# Patient Record
Sex: FEMALE | NOT HISPANIC OR LATINO
[De-identification: names, ages, dates, MRNs, and addresses within clinical notes are randomized per-mention and may not be internally consistent; named-entity substitution may affect disease eponyms.]

---

## 2023-01-31 PROBLEM — Z00.00 ENCOUNTER FOR PREVENTIVE HEALTH EXAMINATION: Status: ACTIVE | Noted: 2023-01-31

## 2023-02-01 ENCOUNTER — APPOINTMENT (OUTPATIENT)
Dept: ORTHOPEDIC SURGERY | Facility: CLINIC | Age: 68
End: 2023-02-01

## 2023-02-14 ENCOUNTER — APPOINTMENT (OUTPATIENT)
Dept: ORTHOPEDIC SURGERY | Facility: CLINIC | Age: 68
End: 2023-02-14
Payer: MEDICARE

## 2023-02-14 VITALS — BODY MASS INDEX: 31.28 KG/M2 | HEIGHT: 62 IN | WEIGHT: 170 LBS

## 2023-02-14 DIAGNOSIS — M77.12 LATERAL EPICONDYLITIS, RIGHT ELBOW: ICD-10-CM

## 2023-02-14 DIAGNOSIS — M77.11 LATERAL EPICONDYLITIS, RIGHT ELBOW: ICD-10-CM

## 2023-02-14 DIAGNOSIS — M25.521 PAIN IN RIGHT ELBOW: ICD-10-CM

## 2023-02-14 PROCEDURE — 73070 X-RAY EXAM OF ELBOW: CPT | Mod: RT

## 2023-02-14 PROCEDURE — 99203 OFFICE O/P NEW LOW 30 MIN: CPT

## 2023-02-14 NOTE — HISTORY OF PRESENT ILLNESS
[de-identified] : Patient presents for evaluation on RT elbow pain. States she had fall back in 2012 on her elbow which she had surgery at [facility] with Dr. Cunningham. Patient states that she has pain in both elbows but RT is worse than LT. Describes the pain as dull and aching. States that she has been icing and doing warm compresses to help. Patient is taking aleve as needed. Patient is RHD. The pain started about 2 weeks ago after she tried doing push ups as part of exercise routine.

## 2023-02-14 NOTE — PHYSICAL EXAM
[NL (150)] : flexion 150 degrees [NL (0)] : extension 0 degrees [NL (90)] : supination 90 degrees [4___] : extension 4[unfilled]/5 [] : light touch intact [Right] : right elbow [There are no fractures, subluxations or dislocations. No significant abnormalities are seen] : There are no fractures, subluxations or dislocations. No significant abnormalities are seen [FreeTextEntry3] : healed incision, prominent hardware [FreeTextEntry1] : olecranon plate and screws in place without signs of hardware failure

## 2023-02-14 NOTE — DISCUSSION/SUMMARY
[de-identified] : I reviewed patient's radiographs and discussed her condition and treatment options.  I advised course of PT.  Patient voiced understanding and agreement with the plan.\par

## 2023-03-20 ENCOUNTER — TRANSCRIPTION ENCOUNTER (OUTPATIENT)
Age: 68
End: 2023-03-20

## 2023-03-27 ENCOUNTER — FORM ENCOUNTER (OUTPATIENT)
Age: 68
End: 2023-03-27

## 2023-04-14 ENCOUNTER — APPOINTMENT (OUTPATIENT)
Dept: CARDIOLOGY | Facility: CLINIC | Age: 68
End: 2023-04-14
Payer: MEDICARE

## 2023-04-14 ENCOUNTER — NON-APPOINTMENT (OUTPATIENT)
Age: 68
End: 2023-04-14

## 2023-04-14 VITALS
OXYGEN SATURATION: 99 % | BODY MASS INDEX: 30.36 KG/M2 | HEIGHT: 62 IN | WEIGHT: 165 LBS | SYSTOLIC BLOOD PRESSURE: 102 MMHG | HEART RATE: 70 BPM | DIASTOLIC BLOOD PRESSURE: 70 MMHG

## 2023-04-14 DIAGNOSIS — Z86.59 PERSONAL HISTORY OF OTHER MENTAL AND BEHAVIORAL DISORDERS: ICD-10-CM

## 2023-04-14 DIAGNOSIS — Z78.9 OTHER SPECIFIED HEALTH STATUS: ICD-10-CM

## 2023-04-14 DIAGNOSIS — Z83.3 FAMILY HISTORY OF DIABETES MELLITUS: ICD-10-CM

## 2023-04-14 PROCEDURE — 99204 OFFICE O/P NEW MOD 45 MIN: CPT

## 2023-04-14 NOTE — ASSESSMENT
[FreeTextEntry1] : Palpitations: 1 week event monitoring will be arranged when she is next tapering her benzodiazepine.\par \par Shortness of breath: Transthoracic echocardiography is indicated to assess for myocardial disease.

## 2023-04-14 NOTE — REASON FOR VISIT
[FreeTextEntry1] : The patient is complaining of palpitations.  The patient is attempting to withdrawal off of benzodiazepine drugs.  Every time she reduces the dose she has palpitations at night.  They last for about a minute at a time.  She awakes with the palpitations.  There were no other associated symptoms.  They resolved with deep breaths and water.  There has been no syncope.  There has been no near syncope.  The patient has fairly good exercise capacity.  There is minimal dyspnea with heavy exertion which she feels is normal for her.

## 2023-04-19 ENCOUNTER — APPOINTMENT (OUTPATIENT)
Dept: CARDIOLOGY | Facility: CLINIC | Age: 68
End: 2023-04-19
Payer: MEDICARE

## 2023-04-19 PROCEDURE — ZZZZZ: CPT

## 2023-04-21 ENCOUNTER — APPOINTMENT (OUTPATIENT)
Dept: CARDIOLOGY | Facility: CLINIC | Age: 68
End: 2023-04-21
Payer: MEDICARE

## 2023-04-21 PROCEDURE — ZZZZZ: CPT

## 2023-04-26 ENCOUNTER — APPOINTMENT (OUTPATIENT)
Dept: CARDIOLOGY | Facility: CLINIC | Age: 68
End: 2023-04-26
Payer: MEDICARE

## 2023-04-26 VITALS
HEIGHT: 62 IN | BODY MASS INDEX: 30.36 KG/M2 | HEART RATE: 71 BPM | DIASTOLIC BLOOD PRESSURE: 80 MMHG | OXYGEN SATURATION: 100 % | SYSTOLIC BLOOD PRESSURE: 132 MMHG | WEIGHT: 165 LBS

## 2023-04-26 PROCEDURE — 99213 OFFICE O/P EST LOW 20 MIN: CPT

## 2023-04-26 NOTE — REASON FOR VISIT
[FreeTextEntry1] : Palps:  event monitor pending.  Still w palps, no syncope, no near syncope.\par \par SOB:  echo pending, min brito.

## 2023-04-26 NOTE — ASSESSMENT
[FreeTextEntry1] : Palps:  event monitor pending.  Still w palps, no syncope, no near syncope.\par \par SOB:  echo pending, min brito.\par \par Continues to have anxiety.

## 2023-04-28 ENCOUNTER — NON-APPOINTMENT (OUTPATIENT)
Age: 68
End: 2023-04-28

## 2023-05-01 ENCOUNTER — APPOINTMENT (OUTPATIENT)
Dept: CARDIOLOGY | Facility: CLINIC | Age: 68
End: 2023-05-01

## 2023-05-05 ENCOUNTER — APPOINTMENT (OUTPATIENT)
Dept: CARDIOLOGY | Facility: CLINIC | Age: 68
End: 2023-05-05
Payer: SELF-PAY

## 2023-05-05 PROCEDURE — 93244 EXT ECG>48HR<7D REV&INTERPJ: CPT

## 2023-05-11 ENCOUNTER — APPOINTMENT (OUTPATIENT)
Dept: CARDIOLOGY | Facility: CLINIC | Age: 68
End: 2023-05-11
Payer: MEDICARE

## 2023-05-11 VITALS
SYSTOLIC BLOOD PRESSURE: 110 MMHG | OXYGEN SATURATION: 99 % | WEIGHT: 160 LBS | HEART RATE: 80 BPM | DIASTOLIC BLOOD PRESSURE: 80 MMHG | BODY MASS INDEX: 29.26 KG/M2

## 2023-05-11 PROCEDURE — 99213 OFFICE O/P EST LOW 20 MIN: CPT

## 2023-05-11 PROCEDURE — 93306 TTE W/DOPPLER COMPLETE: CPT

## 2023-05-11 NOTE — REASON FOR VISIT
[FreeTextEntry1] : Palpitations: Subjectively slightly better.  The patient continues to have on-and-off palpitations.  There has been no syncope.  There is been no near syncope.  Event monitoring reveals atrial tachycardia for maximum of 20 to 30 seconds.  The risk and benefits of beta-blockade have been reviewed.  The patient does not wish to embark on beta-blockade therapy.\par \par Shortness of breath: No complaints today.\par \par Anxiety: The patient is attempting to taper off her benzodiazepine.

## 2023-06-08 ENCOUNTER — NON-APPOINTMENT (OUTPATIENT)
Age: 68
End: 2023-06-08

## 2023-07-11 ENCOUNTER — APPOINTMENT (OUTPATIENT)
Dept: CARDIOLOGY | Facility: CLINIC | Age: 68
End: 2023-07-11

## 2023-07-20 ENCOUNTER — NON-APPOINTMENT (OUTPATIENT)
Age: 68
End: 2023-07-20

## 2023-07-25 ENCOUNTER — APPOINTMENT (OUTPATIENT)
Dept: ORTHOPEDIC SURGERY | Facility: CLINIC | Age: 68
End: 2023-07-25

## 2023-07-28 PROBLEM — Z00.00 ENCOUNTER FOR PREVENTIVE HEALTH EXAMINATION: Status: ACTIVE | Noted: 2023-07-28

## 2023-08-03 ENCOUNTER — APPOINTMENT (OUTPATIENT)
Dept: NEUROSURGERY | Facility: CLINIC | Age: 68
End: 2023-08-03

## 2023-08-04 ENCOUNTER — APPOINTMENT (OUTPATIENT)
Dept: CARDIOLOGY | Facility: CLINIC | Age: 68
End: 2023-08-04

## 2023-08-09 ENCOUNTER — APPOINTMENT (OUTPATIENT)
Dept: CARDIOLOGY | Facility: CLINIC | Age: 68
End: 2023-08-09

## 2023-08-16 ENCOUNTER — NON-APPOINTMENT (OUTPATIENT)
Age: 68
End: 2023-08-16

## 2023-08-16 ENCOUNTER — APPOINTMENT (OUTPATIENT)
Dept: OPHTHALMOLOGY | Facility: CLINIC | Age: 68
End: 2023-08-16
Payer: MEDICARE

## 2023-08-16 PROCEDURE — 92004 COMPRE OPH EXAM NEW PT 1/>: CPT

## 2023-08-24 ENCOUNTER — NON-APPOINTMENT (OUTPATIENT)
Age: 68
End: 2023-08-24

## 2023-08-28 ENCOUNTER — APPOINTMENT (OUTPATIENT)
Dept: ULTRASOUND IMAGING | Facility: CLINIC | Age: 68
End: 2023-08-28
Payer: MEDICARE

## 2023-08-28 PROCEDURE — 76770 US EXAM ABDO BACK WALL COMP: CPT

## 2023-09-13 ENCOUNTER — APPOINTMENT (OUTPATIENT)
Dept: OPHTHALMOLOGY | Facility: CLINIC | Age: 68
End: 2023-09-13
Payer: MEDICARE

## 2023-09-13 ENCOUNTER — NON-APPOINTMENT (OUTPATIENT)
Age: 68
End: 2023-09-13

## 2023-09-13 PROCEDURE — 99213 OFFICE O/P EST LOW 20 MIN: CPT

## 2023-09-22 ENCOUNTER — APPOINTMENT (OUTPATIENT)
Dept: CARDIOLOGY | Facility: CLINIC | Age: 68
End: 2023-09-22
Payer: MEDICARE

## 2023-09-22 VITALS
HEIGHT: 62 IN | BODY MASS INDEX: 27.97 KG/M2 | SYSTOLIC BLOOD PRESSURE: 126 MMHG | DIASTOLIC BLOOD PRESSURE: 64 MMHG | HEART RATE: 70 BPM | WEIGHT: 152 LBS | OXYGEN SATURATION: 99 %

## 2023-09-22 PROCEDURE — 99204 OFFICE O/P NEW MOD 45 MIN: CPT

## 2023-09-22 RX ORDER — MIRTAZAPINE 15 MG/1
15 TABLET, FILM COATED ORAL
Refills: 0 | Status: ACTIVE | COMMUNITY

## 2023-09-22 RX ORDER — LEVOTHYROXINE SODIUM 88 UG/1
88 CAPSULE ORAL DAILY
Refills: 0 | Status: ACTIVE | COMMUNITY

## 2023-09-22 RX ORDER — LORAZEPAM 0.5 MG/1
0.5 TABLET ORAL 3 TIMES DAILY
Refills: 0 | Status: DISCONTINUED | COMMUNITY
End: 2023-09-22

## 2023-09-23 ENCOUNTER — LABORATORY RESULT (OUTPATIENT)
Age: 68
End: 2023-09-23

## 2023-10-03 ENCOUNTER — APPOINTMENT (OUTPATIENT)
Dept: OPHTHALMOLOGY | Facility: CLINIC | Age: 68
End: 2023-10-03

## 2023-11-01 ENCOUNTER — NON-APPOINTMENT (OUTPATIENT)
Age: 68
End: 2023-11-01

## 2023-11-07 ENCOUNTER — APPOINTMENT (OUTPATIENT)
Dept: OPHTHALMOLOGY | Facility: CLINIC | Age: 68
End: 2023-11-07

## 2023-11-13 ENCOUNTER — NON-APPOINTMENT (OUTPATIENT)
Age: 68
End: 2023-11-13

## 2023-11-14 ENCOUNTER — NON-APPOINTMENT (OUTPATIENT)
Age: 68
End: 2023-11-14

## 2023-11-15 ENCOUNTER — APPOINTMENT (OUTPATIENT)
Dept: CARDIOLOGY | Facility: CLINIC | Age: 68
End: 2023-11-15

## 2023-11-18 ENCOUNTER — NON-APPOINTMENT (OUTPATIENT)
Age: 68
End: 2023-11-18

## 2023-11-24 ENCOUNTER — APPOINTMENT (OUTPATIENT)
Dept: CARDIOLOGY | Facility: CLINIC | Age: 68
End: 2023-11-24
Payer: MEDICARE

## 2023-11-24 VITALS
SYSTOLIC BLOOD PRESSURE: 114 MMHG | WEIGHT: 151 LBS | BODY MASS INDEX: 27.62 KG/M2 | OXYGEN SATURATION: 98 % | DIASTOLIC BLOOD PRESSURE: 70 MMHG | HEART RATE: 60 BPM

## 2023-11-24 DIAGNOSIS — Z86.79 PERSONAL HISTORY OF OTHER DISEASES OF THE CIRCULATORY SYSTEM: ICD-10-CM

## 2023-11-24 DIAGNOSIS — I35.1 NONRHEUMATIC AORTIC (VALVE) INSUFFICIENCY: ICD-10-CM

## 2023-11-24 PROCEDURE — 99204 OFFICE O/P NEW MOD 45 MIN: CPT

## 2023-11-24 PROCEDURE — 99214 OFFICE O/P EST MOD 30 MIN: CPT

## 2023-11-24 RX ORDER — KRILL/OM-3/DHA/EPA/PHOSPHO/AST 1000-230MG
81 CAPSULE ORAL DAILY
Qty: 90 | Refills: 3 | Status: ACTIVE | COMMUNITY
Start: 2023-11-24

## 2023-11-24 RX ORDER — CHOLECALCIFEROL (VITAMIN D3) 25 MCG
TABLET ORAL DAILY
Refills: 0 | Status: ACTIVE | COMMUNITY

## 2023-11-28 ENCOUNTER — APPOINTMENT (OUTPATIENT)
Dept: OPHTHALMOLOGY | Facility: CLINIC | Age: 68
End: 2023-11-28

## 2023-11-29 LAB
ALBUMIN SERPL ELPH-MCNC: 4.3 G/DL
ALP BLD-CCNC: 85 U/L
ALT SERPL-CCNC: 20 U/L
ANION GAP SERPL CALC-SCNC: 11 MMOL/L
AST SERPL-CCNC: 16 U/L
BILIRUB DIRECT SERPL-MCNC: 0.1 MG/DL
BILIRUB INDIRECT SERPL-MCNC: 0.2 MG/DL
BILIRUB SERPL-MCNC: 0.4 MG/DL
BUN SERPL-MCNC: 20 MG/DL
CALCIUM SERPL-MCNC: 9.3 MG/DL
CHLORIDE SERPL-SCNC: 99 MMOL/L
CO2 SERPL-SCNC: 25 MMOL/L
CREAT SERPL-MCNC: 0.83 MG/DL
EGFR: 76 ML/MIN/1.73M2
GLUCOSE SERPL-MCNC: 67 MG/DL
POTASSIUM SERPL-SCNC: 4.1 MMOL/L
PROT SERPL-MCNC: 7 G/DL
SODIUM SERPL-SCNC: 134 MMOL/L

## 2023-12-08 ENCOUNTER — APPOINTMENT (OUTPATIENT)
Dept: CARDIOLOGY | Facility: CLINIC | Age: 68
End: 2023-12-08
Payer: MEDICARE

## 2023-12-08 VITALS
BODY MASS INDEX: 27.05 KG/M2 | SYSTOLIC BLOOD PRESSURE: 130 MMHG | WEIGHT: 147 LBS | HEIGHT: 62 IN | OXYGEN SATURATION: 98 % | HEART RATE: 66 BPM | DIASTOLIC BLOOD PRESSURE: 76 MMHG

## 2023-12-08 PROCEDURE — 99214 OFFICE O/P EST MOD 30 MIN: CPT

## 2023-12-13 ENCOUNTER — NON-APPOINTMENT (OUTPATIENT)
Age: 68
End: 2023-12-13

## 2023-12-20 ENCOUNTER — APPOINTMENT (OUTPATIENT)
Dept: CARDIOLOGY | Facility: CLINIC | Age: 68
End: 2023-12-20
Payer: MEDICARE

## 2023-12-20 VITALS
BODY MASS INDEX: 26.68 KG/M2 | HEIGHT: 62 IN | WEIGHT: 145 LBS | DIASTOLIC BLOOD PRESSURE: 70 MMHG | HEART RATE: 83 BPM | SYSTOLIC BLOOD PRESSURE: 100 MMHG | OXYGEN SATURATION: 99 %

## 2023-12-20 DIAGNOSIS — R07.89 OTHER CHEST PAIN: ICD-10-CM

## 2023-12-20 PROCEDURE — 99213 OFFICE O/P EST LOW 20 MIN: CPT

## 2023-12-20 NOTE — REASON FOR VISIT
[FreeTextEntry1] : 67 y/o F with hx as noted below that presents with nonspecific complaints to include fatigue, stomach discomfort, anxiety, etc.  She stopped taking her metoprolol as she felt is was changing her stool color.    CAD: Patient status post coronary stenting.   The patient has significant anxiety.  The need for compliance with dual antiplatelet therapy has been reviewed.  Denies abnormal bleeding.   Epigastric discomfort: The patient has had epigastric discomfort ever since starting statin therapy.  Discomfort improved, but remains.   Depression: The patient is suffering from severe depression.  Is causing her to be inactive.  Overall we have elected to discontinue beta-blocker therapy.  EKG today with NSR.

## 2023-12-20 NOTE — REVIEW OF SYSTEMS
[SOB] : shortness of breath [Dyspnea on exertion] : dyspnea during exertion [Chest Discomfort] : chest discomfort [Lower Ext Edema] : no extremity edema [Palpitations] : palpitations [Orthopnea] : no orthopnea [PND] : no PND [Negative] : Respiratory

## 2023-12-30 ENCOUNTER — NON-APPOINTMENT (OUTPATIENT)
Age: 68
End: 2023-12-30

## 2024-01-05 ENCOUNTER — NON-APPOINTMENT (OUTPATIENT)
Age: 69
End: 2024-01-05

## 2024-01-05 ENCOUNTER — APPOINTMENT (OUTPATIENT)
Dept: CARDIOLOGY | Facility: CLINIC | Age: 69
End: 2024-01-05
Payer: MEDICARE

## 2024-01-05 VITALS
SYSTOLIC BLOOD PRESSURE: 110 MMHG | DIASTOLIC BLOOD PRESSURE: 74 MMHG | OXYGEN SATURATION: 99 % | HEART RATE: 73 BPM | HEIGHT: 62 IN | BODY MASS INDEX: 27.98 KG/M2

## 2024-01-05 VITALS
WEIGHT: 153 LBS | HEART RATE: 53 BPM | HEIGHT: 62 IN | SYSTOLIC BLOOD PRESSURE: 110 MMHG | BODY MASS INDEX: 28.16 KG/M2 | DIASTOLIC BLOOD PRESSURE: 58 MMHG | OXYGEN SATURATION: 99 %

## 2024-01-05 PROCEDURE — 99214 OFFICE O/P EST MOD 30 MIN: CPT

## 2024-01-05 RX ORDER — TICAGRELOR 90 MG/1
90 TABLET ORAL TWICE DAILY
Qty: 180 | Refills: 1 | Status: DISCONTINUED | COMMUNITY
Start: 2023-11-24 | End: 2024-01-05

## 2024-01-05 RX ORDER — ATORVASTATIN CALCIUM 40 MG/1
40 TABLET, FILM COATED ORAL
Qty: 90 | Refills: 2 | Status: DISCONTINUED | COMMUNITY
Start: 2023-11-24 | End: 2024-01-05

## 2024-01-05 NOTE — DISCUSSION/SUMMARY
[Optimized for Surgery] : the patient is optimized for surgery [FreeTextEntry1] : Pre Operative Issues:  The patient is maximized for the planned procedure.  Proceed without delay.  Keep input equal to output.  Standard DVT prophylaxis is recommended.  Given the recent coronary stent have recommended continue aspirin and Plavix through the procedure without interruption.

## 2024-01-05 NOTE — HISTORY OF PRESENT ILLNESS
[Scheduled Procedure ___] : a [unfilled] [FreeTextEntry1] :   The patient is seen for follow-up of coronary stenting.  There has been no chest discomfort.  There is been no shortness of breath.  She has abdominal problems.  I have advised her to see a GI doctor in this regard.  She has intermittent diarrhea and constipation.  The patient believes it was secondary to a atorvastatin.  Atorvastatin 40 mg daily was discontinued.  She is uncertain if her GI symptoms are any better.  Overall we elected to attempt a atorvastatin 10 mg p.o. every other day.

## 2024-01-05 NOTE — ASSESSMENT
[FreeTextEntry1] : Reviewed today:  Cardiac catheterization resulted in LAD stenting on 11/15/2023. Transthoracic echocardiography revealed ejection fraction of 60% with mild to moderate aortic insufficiency in May 2023.  Prescription drug management: Today we elected to start atorvastatin 10 mg p.o. every other day.  LFTs and cholesterol profile have been arranged for 6 weeks time.  CAD: The need to continue aspirin and Plavix has been reviewed.  There is no chest discomfort.  There is no shortness of breath.    Hyperlipidemia: Plan will be to recheck LFTs and cholesterol profile after starting a atorvastatin 10 mg p.o. every other day.

## 2024-01-08 ENCOUNTER — APPOINTMENT (OUTPATIENT)
Dept: CARDIOLOGY | Facility: CLINIC | Age: 69
End: 2024-01-08

## 2024-01-11 ENCOUNTER — APPOINTMENT (OUTPATIENT)
Dept: CARDIOLOGY | Facility: CLINIC | Age: 69
End: 2024-01-11

## 2024-02-06 ENCOUNTER — APPOINTMENT (OUTPATIENT)
Dept: OPHTHALMOLOGY | Facility: CLINIC | Age: 69
End: 2024-02-06

## 2024-02-13 ENCOUNTER — NON-APPOINTMENT (OUTPATIENT)
Age: 69
End: 2024-02-13

## 2024-02-21 ENCOUNTER — APPOINTMENT (OUTPATIENT)
Dept: CARDIOLOGY | Facility: CLINIC | Age: 69
End: 2024-02-21
Payer: MEDICARE

## 2024-02-21 ENCOUNTER — NON-APPOINTMENT (OUTPATIENT)
Age: 69
End: 2024-02-21

## 2024-02-21 VITALS
OXYGEN SATURATION: 98 % | BODY MASS INDEX: 27.6 KG/M2 | DIASTOLIC BLOOD PRESSURE: 78 MMHG | WEIGHT: 150 LBS | HEART RATE: 71 BPM | HEIGHT: 62 IN | SYSTOLIC BLOOD PRESSURE: 118 MMHG

## 2024-02-21 PROCEDURE — 99214 OFFICE O/P EST MOD 30 MIN: CPT

## 2024-02-21 RX ORDER — DIAZEPAM 5 MG/1
TABLET ORAL
Refills: 0 | Status: ACTIVE | COMMUNITY

## 2024-02-21 NOTE — REASON FOR VISIT
[FreeTextEntry1] : The patient is seen for follow-up of CAD.  The patient has good exercise capacity without exertional symptoms.  On last visit we attempted Lipitor 10 mg every other day because she thought higher dose was causing stomach problems.  She is tolerating.  LFTs and cholesterol profile been arranged.

## 2024-02-21 NOTE — ASSESSMENT
[FreeTextEntry1] : Invasive coronary angiography in November 2023 revealed significant severe proximal LAD disease which underwent PCI.  Transthoracic echocardiography on May 11, 2023 revealed normal left ventricular function and mild to moderate aortic insufficiency.  On January 5, 2024 the patient underwent phlebotomy revealing LDL of 97 SGOT and SGPT normal at 19 and 15.  CAD: The patient is asymptomatic.  Plan will be to continue aspirin and Plavix therapy.  Hyperlipidemia: So far the patient is tolerating atorvastatin 10 mg every other day.  LFTs and cholesterol profile in the fasting state have been arranged.  If she is not at target goal we will attempt 10 mg daily.

## 2024-02-22 ENCOUNTER — NON-APPOINTMENT (OUTPATIENT)
Age: 69
End: 2024-02-22

## 2024-02-22 ENCOUNTER — APPOINTMENT (OUTPATIENT)
Dept: OPHTHALMOLOGY | Facility: CLINIC | Age: 69
End: 2024-02-22
Payer: MEDICARE

## 2024-02-22 PROCEDURE — 99213 OFFICE O/P EST LOW 20 MIN: CPT

## 2024-02-26 ENCOUNTER — LABORATORY RESULT (OUTPATIENT)
Age: 69
End: 2024-02-26

## 2024-02-29 ENCOUNTER — TRANSCRIPTION ENCOUNTER (OUTPATIENT)
Age: 69
End: 2024-02-29

## 2024-02-29 ENCOUNTER — APPOINTMENT (OUTPATIENT)
Dept: CARDIOLOGY | Facility: CLINIC | Age: 69
End: 2024-02-29

## 2024-03-06 ENCOUNTER — APPOINTMENT (OUTPATIENT)
Dept: CARDIOLOGY | Facility: CLINIC | Age: 69
End: 2024-03-06

## 2024-03-06 ENCOUNTER — TRANSCRIPTION ENCOUNTER (OUTPATIENT)
Age: 69
End: 2024-03-06

## 2024-03-14 ENCOUNTER — APPOINTMENT (OUTPATIENT)
Dept: CARDIOLOGY | Facility: CLINIC | Age: 69
End: 2024-03-14
Payer: MEDICARE

## 2024-03-14 VITALS
HEIGHT: 62 IN | DIASTOLIC BLOOD PRESSURE: 68 MMHG | HEART RATE: 65 BPM | BODY MASS INDEX: 27.05 KG/M2 | OXYGEN SATURATION: 98 % | SYSTOLIC BLOOD PRESSURE: 118 MMHG | WEIGHT: 147 LBS

## 2024-03-14 PROCEDURE — 99215 OFFICE O/P EST HI 40 MIN: CPT

## 2024-03-14 PROCEDURE — 93242 EXT ECG>48HR<7D RECORDING: CPT

## 2024-03-14 RX ORDER — CLOPIDOGREL BISULFATE 75 MG/1
75 TABLET, FILM COATED ORAL DAILY
Qty: 90 | Refills: 1 | Status: ACTIVE | COMMUNITY
Start: 2023-12-26 | End: 1900-01-01

## 2024-03-14 NOTE — HISTORY OF PRESENT ILLNESS
[FreeTextEntry1] : 69 yo F with anxiety, benzodiazepine dependence, chronic hyponatremia, hypothyroidism, osteopenia, CAD s/p PCI LAD 11 /2023, mild-mod AR, paroxysmal atach (seen on zio patch monitor 4/2023), baseline sinus bradycardia presents today to review recent hospitalization.  Patient has had 2 back-to-back ED visits.  She initially presented in February with increased palpitations X 3 days (more than her norm). She is set to start on H pylori therapy and was quite anxious that the side effects of this treatment includes possible palpitations.  He was kept in the hospital on telemetry and successfully was able to start her H. pylori therapy.  However, she was then later admitted from 3/3 to 3/6/2024 with malaise and abdominal discomfort as well as diarrhea found to be norovirus positive.  She was also noticed to have chronic hyponatremia with sodium around 131.  She noticed ongoing difficulties with her H. pylori therapy and GI had recommended that she can see an allergist before resuming the medication due to flushing.  She presents today for hospital follow-up.  Today she is very anxious she is quite concerned that her Pedialyte may not be enough to support her sodium.  Discharge sodium was 130 and her PCP recently did labs this week with a sodium of 130 and creatinine of 0.78.  She was previously told that this is secondary to her mirtazapine.  She also points to numerous bruises and bumps on her body.  She is worried about potential cancer.  She says she has ongoing palpitations and is feeling quite fatigued.  She denies any chest pain, orthopnea, dizziness or chest pain.

## 2024-03-14 NOTE — CARDIOLOGY SUMMARY
[de-identified] : 3/14/2024: NSR, HR 66, LAFB, nonspecific intraventricular conduction delay, prior inferior and anterior MI similar to prior.

## 2024-03-14 NOTE — ASSESSMENT
[FreeTextEntry1] : 67 yo F with anxiety, benzodiazepine dependence, chronic hyponatremia, hypothyroidism, osteopenia, CAD s/p PCI LAD 11 /2023, mild-mod AR, paroxysmal atach (seen on zio patch monitor 4/2023), baseline sinus bradycardia presents today to review recent hospitalization.

## 2024-03-14 NOTE — PHYSICAL EXAM
[Normal] : normal gait [No Edema] : no edema [Moves all extremities] : moves all extremities [de-identified] : Extremely anxious and nervous with tangential speech [de-identified] : warm [de-identified] : Multiple ecchymoses at possible IV sites on her arms with a hard calcified hematoma under each.  She also has bruises on her bilateral legs with similar hematoma.

## 2024-03-14 NOTE — DISCUSSION/SUMMARY
[FreeTextEntry1] : #Palpitations -On telemetry monitoring in the hospital no arrhythmias noted.  However, will pursue a prolonged monitoring period of 5 days with Holter monitor.  #CAD -Recent PCI to LAD in 11/20/2023.  Does need to continue on DAPT from an interventional perspective.  We did discuss that bruising is common with these medications.  She is still quite concerned that she might have cancer causing these "bumps".  She would like to be referred to a hematologist for second opinion. - Continue on statin. -She is not on any beta-blocker.  Patient is quite nervous with starting any new medications.  She does have an indication for beta-blocker with CAD.  Will continue to explore this with her at next visit.  She defers for now.  #Chronic hyponatremia -May be low in the setting of diarrhea.  She is still having 1-2 loose bowel movements per day.  Also she is taking a number of antidepressants which may cause this.  She is followed by her PCP for this.  I have instructed her to touch base with her psychiatrist about medication safety.  Follow-up with Dr. Abel in 1 month.

## 2024-03-20 ENCOUNTER — OUTPATIENT (OUTPATIENT)
Dept: OUTPATIENT SERVICES | Facility: HOSPITAL | Age: 69
LOS: 1 days | End: 2024-03-20

## 2024-03-20 DIAGNOSIS — R23.3 SPONTANEOUS ECCHYMOSES: ICD-10-CM

## 2024-03-27 ENCOUNTER — APPOINTMENT (OUTPATIENT)
Dept: CARDIOLOGY | Facility: CLINIC | Age: 69
End: 2024-03-27
Payer: MEDICARE

## 2024-03-27 VITALS
OXYGEN SATURATION: 99 % | BODY MASS INDEX: 26.7 KG/M2 | DIASTOLIC BLOOD PRESSURE: 78 MMHG | SYSTOLIC BLOOD PRESSURE: 104 MMHG | HEART RATE: 75 BPM | WEIGHT: 146 LBS

## 2024-03-27 DIAGNOSIS — R00.2 PALPITATIONS: ICD-10-CM

## 2024-03-27 DIAGNOSIS — E87.1 HYPO-OSMOLALITY AND HYPONATREMIA: ICD-10-CM

## 2024-03-27 PROCEDURE — 99204 OFFICE O/P NEW MOD 45 MIN: CPT

## 2024-03-27 PROCEDURE — 93244 EXT ECG>48HR<7D REV&INTERPJ: CPT

## 2024-03-27 RX ORDER — METOPROLOL SUCCINATE 25 MG/1
25 TABLET, EXTENDED RELEASE ORAL
Qty: 90 | Refills: 1 | Status: ACTIVE | COMMUNITY
Start: 2023-11-28 | End: 1900-01-01

## 2024-03-28 NOTE — ASSESSMENT
[FreeTextEntry1] : 69 yo F with anxiety, benzodiazepine dependence, chronic hyponatremia, hypothyroidism, osteopenia, CAD s/p PCI LAD 11 /2023, mild-mod AR, paroxysmal atach (seen on zio patch monitor 4/2023), baseline sinus bradycardia presents today to review recent hospitalization.

## 2024-03-28 NOTE — DISCUSSION/SUMMARY
[FreeTextEntry1] :  #Palpitations has diagnosed atrial tachycardia. Dr. Calhoun ordered rhythm monitor appears to have some SVT. I told her to try taking the metoprolol 25 she was prescribed prior and discuss more with primary cardiologist next week. There are no alarm signs/symptoms. She should discuss mirtazepine as contributor for hyponatremia and comply with neprhology recommendations.   Rest as per prior. Follow-up with Dr. Abel as scheduled in a few days.  ER precautions given to patient.

## 2024-03-28 NOTE — PHYSICAL EXAM
[Normal] : normal gait [Moves all extremities] : moves all extremities [No Edema] : no edema [de-identified] : Extremely anxious and nervous with tangential speech [de-identified] : warm [de-identified] : Multiple ecchymoses at possible IV sites on her arms with a hard calcified hematoma under each.  She also has bruises on her bilateral legs with similar hematoma.

## 2024-03-28 NOTE — HISTORY OF PRESENT ILLNESS
[FreeTextEntry1] : 69 yo F with anxiety, benzodiazepine dependence, chronic hyponatremia, hypothyroidism, osteopenia, CAD s/p PCI LAD 11 /2023, mild-mod AR, paroxysmal atach (seen on zio patch monitor 4/2023), baseline sinus bradycardia presents today to review recent hospitalization.  She was last seen 2 weeks ago. Now presented last night with melena and abdominal pain, she has been on H pylori treatment. sodium increased with saline. consistent palpitations.   FROM NOTE 2 WEEKS AGO: "Patient has had 2 back-to-back ED visits.  She initially presented in February with increased palpitations X 3 days (more than her norm). She is set to start on H pylori therapy and was quite anxious that the side effects of this treatment includes possible palpitations.  He was kept in the hospital on telemetry and successfully was able to start her H. pylori therapy.  However, she was then later admitted from 3/3 to 3/6/2024 with malaise and abdominal discomfort as well as diarrhea found to be norovirus positive.  She was also noticed to have chronic hyponatremia with sodium around 131.  She noticed ongoing difficulties with her H. pylori therapy and GI had recommended that she can see an allergist before resuming the medication due to flushing. She presents today for hospital follow-up.  Today she is very anxious she is quite concerned that her Pedialyte may not be enough to support her sodium.  Discharge sodium was 130 and her PCP recently did labs this week with a sodium of 130 and creatinine of 0.78.  She was previously told that this is secondary to her mirtazapine.  She also points to numerous bruises and bumps on her body.  She is worried about potential cancer.  She says she has ongoing palpitations and is feeling quite fatigued.  She denies any chest pain, orthopnea, dizziness or chest pain.."

## 2024-03-28 NOTE — REASON FOR VISIT
[Other: ____] : [unfilled] [FreeTextEntry3] : Dr. Royce Vaughn  [FreeTextEntry1] : Primary cardiologist: Dr. Abel

## 2024-03-28 NOTE — CARDIOLOGY SUMMARY
[de-identified] : 3/14/2024: NSR, HR 66, LAFB, nonspecific intraventricular conduction delay, prior inferior and anterior MI similar to prior.

## 2024-03-29 ENCOUNTER — APPOINTMENT (OUTPATIENT)
Dept: CARDIOLOGY | Facility: CLINIC | Age: 69
End: 2024-03-29

## 2024-04-03 ENCOUNTER — APPOINTMENT (OUTPATIENT)
Dept: CARDIOLOGY | Facility: CLINIC | Age: 69
End: 2024-04-03

## 2024-04-04 ENCOUNTER — NON-APPOINTMENT (OUTPATIENT)
Age: 69
End: 2024-04-04

## 2024-04-04 ENCOUNTER — LABORATORY RESULT (OUTPATIENT)
Age: 69
End: 2024-04-04

## 2024-04-04 ENCOUNTER — APPOINTMENT (OUTPATIENT)
Age: 69
End: 2024-04-04
Payer: MEDICARE

## 2024-04-04 VITALS
HEART RATE: 63 BPM | HEIGHT: 62 IN | DIASTOLIC BLOOD PRESSURE: 70 MMHG | OXYGEN SATURATION: 100 % | SYSTOLIC BLOOD PRESSURE: 129 MMHG | BODY MASS INDEX: 27.05 KG/M2 | WEIGHT: 147 LBS | TEMPERATURE: 96.1 F

## 2024-04-04 LAB
HCT VFR BLD CALC: 39.8 %
HGB BLD-MCNC: 13.4 G/DL
MCHC RBC-ENTMCNC: 28 PG
MCHC RBC-ENTMCNC: 33.7 GM/DL
MCV RBC AUTO: 83.3 FL
PLATELET # BLD AUTO: 265 K/UL
RBC # BLD: 4.78 M/UL
RBC # FLD: 13.9 %
WBC # FLD AUTO: 5.87 K/UL

## 2024-04-04 PROCEDURE — G2211 COMPLEX E/M VISIT ADD ON: CPT

## 2024-04-04 PROCEDURE — 99204 OFFICE O/P NEW MOD 45 MIN: CPT

## 2024-04-04 NOTE — REVIEW OF SYSTEMS
[Fatigue] : fatigue [Vision Problems] : vision problems [Chest Pain] : chest pain [Palpitations] : palpitations [Diarrhea: Grade 0] : Diarrhea: Grade 0 [Joint Pain] : joint pain [Insomnia] : insomnia [Anxiety] : anxiety [Depression] : depression [Easy Bruising] : a tendency for easy bruising [Fever] : no fever [Chills] : no chills [Night Sweats] : no night sweats [Loss of Hearing] : no loss of hearing [Nosebleeds] : no nosebleeds [Lower Ext Edema] : no lower extremity edema [Shortness Of Breath] : no shortness of breath [Wheezing] : no wheezing [Cough] : no cough [SOB on Exertion] : no shortness of breath during exertion [Abdominal Pain] : no abdominal pain [Vomiting] : no vomiting [Constipation] : no constipation [Joint Stiffness] : no joint stiffness [Muscle Pain] : no muscle pain [Skin Rash] : no skin rash [Skin Wound] : no skin wound [Dizziness] : no dizziness [Fainting] : no fainting [Difficulty Walking] : no difficulty walking [Suicidal] : not suicidal [Easy Bleeding] : no tendency for easy bleeding [Swollen Glands] : no swollen glands [FreeTextEntry3] : wears glasses [FreeTextEntry5] : intermittent [FreeTextEntry8] : urgency at times [FreeTextEntry9] : knee pain at times, feels "lumps" in arms [de-identified] : severe anxiety

## 2024-04-04 NOTE — HISTORY OF PRESENT ILLNESS
[de-identified] : Referred by: Cardiology PCP: Dr. Royce Vaughn   ROLY SONI presented at age 68 year on 2024 for evaluation of increased bruising and "bumps". She reports she is on Plavix and ASA following stent placement in Nov. Recently has been feeling these "lumps" on her forearm and shin area. Denies enlarged LN's. No fevers, night sweats, weight loss. No recent infections or travel. The patient has a medical history of severe anxiety, h. pylori, CAD. She reports her anxiety causes her to worry about many things, but is seeing a psychiatrist regularly.    HCM: - COVID vaccination: No - Colonoscopy: Yes- 2019 - Gyn: No longer - Mammo: overdue - DEXA: No   SH: - Occupation: retired nurse at [employer] - Living situation: Lives with  at home - Smoking/Etoh/illicit drugs: Denies  FH: Mother-  age 50, diabetes, + smoker Father-  in 80's, lung cancer 2 sisters- alive, CAD No children

## 2024-04-04 NOTE — RESULTS/DATA
[FreeTextEntry1] : ROLY SONI  is a 68 year old female who presents for initial evaluation of bruising and palpable lesion on left forearm.

## 2024-04-04 NOTE — PHYSICAL EXAM
[Restricted in physically strenuous activity but ambulatory and able to carry out work of a light or sedentary nature] : Status 1- Restricted in physically strenuous activity but ambulatory and able to carry out work of a light or sedentary nature, e.g., light house work, office work [Normal] : full range of motion and no deformities appreciated [de-identified] : wears glasses [de-identified] : questionable palpable lipoma left forearm????

## 2024-04-05 LAB
ALBUMIN SERPL ELPH-MCNC: 4.2 G/DL
ALP BLD-CCNC: 65 U/L
ALT SERPL-CCNC: 19 U/L
ANION GAP SERPL CALC-SCNC: 13 MMOL/L
APTT BLD: 37.3 SEC
AST SERPL-CCNC: 19 U/L
BILIRUB SERPL-MCNC: 0.3 MG/DL
BUN SERPL-MCNC: 17 MG/DL
CALCIUM SERPL-MCNC: 8.8 MG/DL
CHLORIDE SERPL-SCNC: 97 MMOL/L
CO2 SERPL-SCNC: 19 MMOL/L
CREAT SERPL-MCNC: 0.68 MG/DL
DEPRECATED D DIMER PPP IA-ACNC: <150 NG/ML DDU
EGFR: 94 ML/MIN/1.73M2
GLUCOSE SERPL-MCNC: 99 MG/DL
INR PPP: 1.01 RATIO
LDH SERPL-CCNC: 192 U/L
POTASSIUM SERPL-SCNC: 4.4 MMOL/L
PROT SERPL-MCNC: 6.9 G/DL
PT BLD: 11.4 SEC
SODIUM SERPL-SCNC: 129 MMOL/L

## 2024-04-09 ENCOUNTER — APPOINTMENT (OUTPATIENT)
Dept: OPHTHALMOLOGY | Facility: CLINIC | Age: 69
End: 2024-04-09
Payer: MEDICARE

## 2024-04-09 ENCOUNTER — NON-APPOINTMENT (OUTPATIENT)
Age: 69
End: 2024-04-09

## 2024-04-09 PROCEDURE — 92014 COMPRE OPH EXAM EST PT 1/>: CPT

## 2024-04-15 ENCOUNTER — APPOINTMENT (OUTPATIENT)
Age: 69
End: 2024-04-15

## 2024-04-17 ENCOUNTER — NON-APPOINTMENT (OUTPATIENT)
Age: 69
End: 2024-04-17

## 2024-04-18 ENCOUNTER — APPOINTMENT (OUTPATIENT)
Dept: CARDIOLOGY | Facility: CLINIC | Age: 69
End: 2024-04-18
Payer: MEDICARE

## 2024-04-18 VITALS
WEIGHT: 147 LBS | BODY MASS INDEX: 27.05 KG/M2 | HEIGHT: 62 IN | DIASTOLIC BLOOD PRESSURE: 72 MMHG | SYSTOLIC BLOOD PRESSURE: 126 MMHG | OXYGEN SATURATION: 97 % | HEART RATE: 67 BPM

## 2024-04-18 DIAGNOSIS — E78.5 HYPERLIPIDEMIA, UNSPECIFIED: ICD-10-CM

## 2024-04-18 DIAGNOSIS — I25.119 ATHEROSCLEROTIC HEART DISEASE OF NATIVE CORONARY ARTERY WITH UNSPECIFIED ANGINA PECTORIS: ICD-10-CM

## 2024-04-18 DIAGNOSIS — R06.00 DYSPNEA, UNSPECIFIED: ICD-10-CM

## 2024-04-18 PROCEDURE — 93306 TTE W/DOPPLER COMPLETE: CPT

## 2024-04-18 PROCEDURE — 99214 OFFICE O/P EST MOD 30 MIN: CPT

## 2024-04-18 NOTE — REASON FOR VISIT
[FreeTextEntry1] : The patient is seen for follow-up of CAD.  The patient status post coronary stenting.  The patient shows fairly good exercise capacity without exertional symptoms.  She has significant GI problems that are bothering her.  There is been no recent chest pain or shortness of breath.

## 2024-04-18 NOTE — ASSESSMENT
[FreeTextEntry1] : CAD: Patient status post coronary stenting.  She is asymptomatic from this perspective.  Hyperlipidemia: Continue statin therapy.  Shortness of breath: None recently.  GI: I have advised GI her primary care evaluation.  Hematocrit was 39 on April 4, 2024.  On February 27, 2024 AST and ALT were normal at 15 and 14.   LDL cholesterol was 60 on February 27, 2024.

## 2024-04-22 ENCOUNTER — NON-APPOINTMENT (OUTPATIENT)
Age: 69
End: 2024-04-22

## 2024-05-06 ENCOUNTER — NON-APPOINTMENT (OUTPATIENT)
Age: 69
End: 2024-05-06

## 2024-05-17 ENCOUNTER — LABORATORY RESULT (OUTPATIENT)
Age: 69
End: 2024-05-17

## 2024-05-17 ENCOUNTER — RESULT REVIEW (OUTPATIENT)
Age: 69
End: 2024-05-17

## 2024-05-17 ENCOUNTER — APPOINTMENT (OUTPATIENT)
Age: 69
End: 2024-05-17
Payer: MEDICARE

## 2024-05-17 VITALS
HEIGHT: 62 IN | OXYGEN SATURATION: 98 % | HEART RATE: 64 BPM | WEIGHT: 145.13 LBS | TEMPERATURE: 98.2 F | DIASTOLIC BLOOD PRESSURE: 71 MMHG | SYSTOLIC BLOOD PRESSURE: 120 MMHG | BODY MASS INDEX: 26.71 KG/M2 | RESPIRATION RATE: 17 BRPM

## 2024-05-17 DIAGNOSIS — M79.89 OTHER SPECIFIED SOFT TISSUE DISORDERS: ICD-10-CM

## 2024-05-17 LAB
BASOPHILS # BLD AUTO: 0.03 K/UL — SIGNIFICANT CHANGE UP (ref 0–0.2)
BASOPHILS NFR BLD AUTO: 0.5 % — SIGNIFICANT CHANGE UP (ref 0–2)
EOSINOPHIL # BLD AUTO: 0.26 K/UL — SIGNIFICANT CHANGE UP (ref 0–0.5)
EOSINOPHIL NFR BLD AUTO: 4.2 % — SIGNIFICANT CHANGE UP (ref 0–6)
HCT VFR BLD CALC: 42.1 % — SIGNIFICANT CHANGE UP (ref 34.5–45)
HGB BLD-MCNC: 14.6 G/DL — SIGNIFICANT CHANGE UP (ref 11.5–15.5)
IMM GRANULOCYTES NFR BLD AUTO: 0.3 % — SIGNIFICANT CHANGE UP (ref 0–0.9)
LYMPHOCYTES # BLD AUTO: 0.61 K/UL — LOW (ref 1–3.3)
LYMPHOCYTES # BLD AUTO: 9.9 % — LOW (ref 13–44)
MCHC RBC-ENTMCNC: 28.6 PG — SIGNIFICANT CHANGE UP (ref 27–34)
MCHC RBC-ENTMCNC: 34.7 GM/DL — SIGNIFICANT CHANGE UP (ref 32–36)
MCV RBC AUTO: 82.4 FL — SIGNIFICANT CHANGE UP (ref 80–100)
MONOCYTES # BLD AUTO: 0.5 K/UL — SIGNIFICANT CHANGE UP (ref 0–0.9)
MONOCYTES NFR BLD AUTO: 8.1 % — SIGNIFICANT CHANGE UP (ref 2–14)
NEUTROPHILS # BLD AUTO: 4.73 K/UL — SIGNIFICANT CHANGE UP (ref 1.8–7.4)
NEUTROPHILS NFR BLD AUTO: 77 % — SIGNIFICANT CHANGE UP (ref 43–77)
NRBC # BLD: 0 /100 WBCS — SIGNIFICANT CHANGE UP (ref 0–0)
PLATELET # BLD AUTO: 254 K/UL — SIGNIFICANT CHANGE UP (ref 150–400)
RBC # BLD: 5.11 M/UL — SIGNIFICANT CHANGE UP (ref 3.8–5.2)
RBC # FLD: 14.1 % — SIGNIFICANT CHANGE UP (ref 10.3–14.5)
WBC # BLD: 6.15 K/UL — SIGNIFICANT CHANGE UP (ref 3.8–10.5)
WBC # FLD AUTO: 6.15 K/UL — SIGNIFICANT CHANGE UP (ref 3.8–10.5)

## 2024-05-17 PROCEDURE — 85027 COMPLETE CBC AUTOMATED: CPT

## 2024-05-17 PROCEDURE — 99214 OFFICE O/P EST MOD 30 MIN: CPT

## 2024-05-17 PROCEDURE — G2211 COMPLEX E/M VISIT ADD ON: CPT

## 2024-05-17 NOTE — REVIEW OF SYSTEMS
[Fatigue] : fatigue [Vision Problems] : vision problems [Chest Pain] : chest pain [Palpitations] : palpitations [Diarrhea: Grade 0] : Diarrhea: Grade 0 [Joint Pain] : joint pain [Insomnia] : insomnia [Anxiety] : anxiety [Depression] : depression [Easy Bruising] : a tendency for easy bruising [Fever] : no fever [Chills] : no chills [Night Sweats] : no night sweats [Loss of Hearing] : no loss of hearing [Nosebleeds] : no nosebleeds [Lower Ext Edema] : no lower extremity edema [Shortness Of Breath] : no shortness of breath [Wheezing] : no wheezing [Cough] : no cough [SOB on Exertion] : no shortness of breath during exertion [Abdominal Pain] : no abdominal pain [Vomiting] : no vomiting [Constipation] : no constipation [Joint Stiffness] : no joint stiffness [Muscle Pain] : no muscle pain [Skin Rash] : no skin rash [Skin Wound] : no skin wound [Dizziness] : no dizziness [Fainting] : no fainting [Difficulty Walking] : no difficulty walking [Suicidal] : not suicidal [Easy Bleeding] : no tendency for easy bleeding [Swollen Glands] : no swollen glands [FreeTextEntry3] : wears glasses [FreeTextEntry5] : intermittent [FreeTextEntry8] : urgency at times [FreeTextEntry9] : knee pain at times, feels "lumps" in arms [de-identified] : severe anxiety

## 2024-05-17 NOTE — PHYSICAL EXAM
[Restricted in physically strenuous activity but ambulatory and able to carry out work of a light or sedentary nature] : Status 1- Restricted in physically strenuous activity but ambulatory and able to carry out work of a light or sedentary nature, e.g., light house work, office work [Normal] : affect appropriate [de-identified] : wears glasses [de-identified] : ?lipoma vs shotty LAD in L forearm [de-identified] : b/l LE excoriations [de-identified] : flat affect, anxious flat affect, anxious mood

## 2024-05-17 NOTE — ADDENDUM
[FreeTextEntry1] : I, Blanca Lee, documented this note as a scribe on behalf of Dr. Leighton Paiz on 05/17/2024.

## 2024-05-17 NOTE — END OF VISIT
[FreeTextEntry4] :  All medical record entries made by the Scribe were at my, Dr. Leighton Paiz, direction and personally dictated by me on 05/17/2024. I have reviewed the chart and agree that the record accurately reflects my personal performance of the history, physical exam, assessment and plan. I have also personally directed, reviewed, and agreed with the chart.  [Time Spent: ___ minutes] : I have spent [unfilled] minutes of time on the encounter.

## 2024-05-17 NOTE — HISTORY OF PRESENT ILLNESS
[de-identified] : Referred by: Cardiology PCP: Dr. Royce Vaughn   ROLY SONI presented at age 68 year on 2024 for evaluation of increased bruising and "bumps". She reports she is on Plavix and ASA following stent placement in Nov. Recently has been feeling these "lumps" on her forearm and shin area. Denies enlarged LN's. No fevers, night sweats, weight loss. No recent infections or travel. The patient has a medical history of severe anxiety, h. pylori, CAD. She reports her anxiety causes her to worry about many things, but is seeing a psychiatrist regularly.    HCM: - COVID vaccination: No - Colonoscopy: Yes- 2019 - Gyn: No longer - Mammo: overdue - DEXA: No   SH: - Occupation: retired nurse at [employer] - Living situation: Lives with  at home - Smoking/Etoh/illicit drugs: Denies  FH: Mother-  age 50, diabetes, + smoker Father-  in 80's, lung cancer 2 sisters- alive, CAD No children    [de-identified] : Patient reports persistent bruising and bumps on arms and spots on legs.  Blood counts WNL at first visit.  Prolonged PTT of 37 at first visit.  Remains on blood thinners.

## 2024-05-20 LAB
APTT 2H P INC PPP: NORMAL
APTT IMM NP/PRE NP PPP: NORMAL
APTT INV RATIO PPP: 32.6 SEC
B2 GLYCOPROT1 AB SER QL: NEGATIVE
CARDIOLIPIN AB SER IA-ACNC: NEGATIVE
NPP NORMAL POOLED PLASMA: NORMAL SECS

## 2024-05-22 LAB
CARDIOLIPIN IGM SER-MCNC: 12 GPL
CARDIOLIPIN IGM SER-MCNC: 25.5 MPL
DEPRECATED CARDIOLIPIN IGA SER: 6.3 APL

## 2024-05-23 LAB — METHYLMALONATE SERPL-SCNC: 151 NMOL/L

## 2024-05-31 ENCOUNTER — NON-APPOINTMENT (OUTPATIENT)
Age: 69
End: 2024-05-31

## 2024-06-03 ENCOUNTER — RX RENEWAL (OUTPATIENT)
Age: 69
End: 2024-06-03

## 2024-06-03 RX ORDER — ATORVASTATIN CALCIUM 10 MG/1
10 TABLET, FILM COATED ORAL
Qty: 45 | Refills: 3 | Status: ACTIVE | COMMUNITY
Start: 2024-01-05 | End: 1900-01-01

## 2024-06-14 ENCOUNTER — APPOINTMENT (OUTPATIENT)
Age: 69
End: 2024-06-14

## 2024-06-14 VITALS
HEIGHT: 62 IN | SYSTOLIC BLOOD PRESSURE: 136 MMHG | BODY MASS INDEX: 26.96 KG/M2 | TEMPERATURE: 98.3 F | WEIGHT: 146.5 LBS | HEART RATE: 66 BPM | DIASTOLIC BLOOD PRESSURE: 73 MMHG | OXYGEN SATURATION: 99 % | RESPIRATION RATE: 16 BRPM

## 2024-06-14 DIAGNOSIS — T14.8XXA OTHER INJURY OF UNSPECIFIED BODY REGION, INITIAL ENCOUNTER: ICD-10-CM

## 2024-06-14 DIAGNOSIS — R79.1 ABNORMAL COAGULATION PROFILE: ICD-10-CM

## 2024-06-14 PROCEDURE — 99214 OFFICE O/P EST MOD 30 MIN: CPT

## 2024-06-14 NOTE — END OF VISIT
[FreeTextEntry4] :  All medical record entries made by the Scribe were at my, Dr. Leighton Paiz, direction and personally dictated by me on 06/14/2024. I have reviewed the chart and agree that the record accurately reflects my personal performance of the history, physical exam, assessment and plan. I have also personally directed, reviewed, and agreed with the chart.

## 2024-06-14 NOTE — PHYSICAL EXAM
[Restricted in physically strenuous activity but ambulatory and able to carry out work of a light or sedentary nature] : Status 1- Restricted in physically strenuous activity but ambulatory and able to carry out work of a light or sedentary nature, e.g., light house work, office work [Normal] : grossly intact [de-identified] : ?lipoma vs shotty LAD in L forearm [de-identified] : b/l LE excoriations [de-identified] : flat affect, anxious flat affect, anxious mood

## 2024-06-14 NOTE — HISTORY OF PRESENT ILLNESS
[de-identified] : Referred by: Cardiology PCP: Dr. Royce Vaughn   ROLY SONI presented at age 68 year on 2024 for evaluation of increased bruising and "bumps". She reports she is on Plavix and ASA following stent placement in Nov. Recently has been feeling these "lumps" on her forearm and shin area. Denies enlarged LN's. No fevers, night sweats, weight loss. No recent infections or travel. The patient has a medical history of severe anxiety, h. pylori, CAD. She reports her anxiety causes her to worry about many things, but is seeing a psychiatrist regularly.    HCM: - COVID vaccination: No - Colonoscopy: Yes- 2019 - Gyn: No longer - Mammo: overdue - DEXA: No   SH: - Occupation: retired nurse at [employer] - Living situation: Lives with  at home - Smoking/Etoh/illicit drugs: Denies  FH: Mother-  age 50, diabetes, + smoker Father-  in 80's, lung cancer 2 sisters- alive, CAD No children    [de-identified] : Patient reports persistent bruising and bumps on arms and spots on legs.  Blood counts WNL at first visit.  Prolonged PTT of 37 at first visit.  Remains on blood thinners.  PTT testing was unremarkable.  Weakly positive IGM.  Bruising likely a result of plavix and aspirin blood thinners.

## 2024-06-14 NOTE — REVIEW OF SYSTEMS
[Fever] : no fever [Chills] : no chills [Night Sweats] : no night sweats [Fatigue] : fatigue [Vision Problems] : vision problems [Loss of Hearing] : no loss of hearing [Nosebleeds] : no nosebleeds [Chest Pain] : chest pain [Palpitations] : palpitations [Lower Ext Edema] : no lower extremity edema [Shortness Of Breath] : no shortness of breath [Wheezing] : no wheezing [Cough] : no cough [SOB on Exertion] : no shortness of breath during exertion [Abdominal Pain] : no abdominal pain [Vomiting] : no vomiting [Constipation] : no constipation [Diarrhea: Grade 0] : Diarrhea: Grade 0 [Joint Pain] : joint pain [Joint Stiffness] : no joint stiffness [Muscle Pain] : no muscle pain [Skin Rash] : no skin rash [Skin Wound] : no skin wound [Dizziness] : no dizziness [Fainting] : no fainting [Difficulty Walking] : no difficulty walking [Suicidal] : not suicidal [Insomnia] : insomnia [Anxiety] : anxiety [Depression] : depression [Easy Bleeding] : no tendency for easy bleeding [Easy Bruising] : a tendency for easy bruising [Swollen Glands] : no swollen glands [FreeTextEntry3] : wears glasses [FreeTextEntry5] : intermittent [FreeTextEntry8] : urgency at times [FreeTextEntry9] : knee pain at times, feels "lumps" in arms [de-identified] : severe anxiety

## 2024-06-14 NOTE — ADDENDUM
[FreeTextEntry1] : I, Blanca Lee, documented this note as a scribe on behalf of Dr. Leighton Paiz on 06/14/2024. 
no complications

## 2024-07-07 ENCOUNTER — NON-APPOINTMENT (OUTPATIENT)
Age: 69
End: 2024-07-07

## 2024-08-06 ENCOUNTER — LABORATORY RESULT (OUTPATIENT)
Age: 69
End: 2024-08-06

## 2024-08-07 ENCOUNTER — APPOINTMENT (OUTPATIENT)
Dept: INFECTIOUS DISEASE | Facility: CLINIC | Age: 69
End: 2024-08-07

## 2024-08-07 PROBLEM — N39.0 RECURRENT UTI: Status: ACTIVE | Noted: 2024-08-07

## 2024-08-07 PROCEDURE — 99204 OFFICE O/P NEW MOD 45 MIN: CPT

## 2024-08-14 NOTE — HISTORY OF PRESENT ILLNESS
[FreeTextEntry1] : 69-year-old female referred to infectious disease office for recurrent UTIs.  Patient has tried several medications in the past including nitrofurantoin, she also has multiple antibiotic allergies including penicillins.  She most recently went to the emergency room with UTI symptoms including dysuria and suprapubic tenderness and she was recommended fosfomycin however she had never taken this before and was hesitant so she declined.  Results reviewed from ER visit urinalysis results consistent with UTI and urine culture with Morganella Morgagni.  I recommended a course of fosfomycin 3 g every 72 hours for 3 doses.  We also repeated urinalysis and urine culture in office on August 7.  Urinalysis showed small leukocyte Estrace negative nitrite 3 white blood cells and few bacteria, urine culture also done on August 7 with growth of Morganella Morgagni cefazolin resistant and nitrofurantoin resistant.  Our RN Lise discussed with patient today and patient said she only took 1 dose of fosfomycin and has not taken the other 2 doses because she developed burning pain with urination following the first dose, discussed with her that it is important to complete treatment course and also prescribed Pyridium as needed for her for burning pain.

## 2024-08-14 NOTE — ASSESSMENT
[FreeTextEntry1] : Recurrent UTI Drug-resistant Morganella Aidengagni urine culture Dysuria  Plan Fosfomycin 3 g complete the other 2 doses every 72 hours for 3 doses total Pyridium 200 mg every 8 hours as needed for pain prescribed Follow-up as needed [Treatment Education] : treatment education [Treatment Adherence] : treatment adherence [Medical Care Issues] : medical care issues [Drug Interactions / Side Effects] : drug interactions/side effects [Disclosure of Diagnosis] : disclosure of diagnosis [Anticipatory Guidance] : anticipatory guidance

## 2024-08-14 NOTE — PHYSICAL EXAM
[General Appearance - Alert] : alert [General Appearance - In No Acute Distress] : in no acute distress [Extraocular Movements] : extraocular movements were intact [Hearing Threshold Finger Rub Not [place]] : hearing was normal [Neck Appearance] : the appearance of the neck was normal [Edema] : there was no peripheral edema [Abdomen Soft] : soft [Musculoskeletal - Swelling] : no joint swelling [] : no rash [No Focal Deficits] : no focal deficits [FreeTextEntry1] : Flat affect, guarded

## 2024-08-14 NOTE — PHYSICAL EXAM
[General Appearance - Alert] : alert [General Appearance - In No Acute Distress] : in no acute distress [Extraocular Movements] : extraocular movements were intact [Hearing Threshold Finger Rub Not [place]] : hearing was normal [Neck Appearance] : the appearance of the neck was normal [Edema] : there was no peripheral edema [Abdomen Soft] : soft [Musculoskeletal - Swelling] : no joint swelling [] : no rash [No Focal Deficits] : no focal deficits [FreeTextEntry1] : Flat affect, guarded

## 2024-08-14 NOTE — REVIEW OF SYSTEMS
[Fever] : no fever [Chills] : no chills [Red Eyes] : eyes not red [Loss Of Hearing] : no hearing loss [Palpitations] : no palpitations [Shortness Of Breath] : no shortness of breath [Abdominal Pain] : no abdominal pain [Dysuria] : dysuria [Joint Pain] : no joint pain [Skin Lesions] : no skin lesions [Confused] : no confusion [Easy Bleeding] : no tendency for easy bleeding

## 2024-08-30 ENCOUNTER — NON-APPOINTMENT (OUTPATIENT)
Age: 69
End: 2024-08-30

## 2024-08-30 DIAGNOSIS — N39.0 URINARY TRACT INFECTION, SITE NOT SPECIFIED: ICD-10-CM

## 2024-08-30 RX ORDER — LEVOFLOXACIN 750 MG/1
750 TABLET, FILM COATED ORAL
Qty: 5 | Refills: 0 | Status: ACTIVE | COMMUNITY
Start: 2024-08-30 | End: 1900-01-01

## 2024-09-04 ENCOUNTER — APPOINTMENT (OUTPATIENT)
Dept: CARDIOLOGY | Facility: CLINIC | Age: 69
End: 2024-09-04

## 2024-09-09 ENCOUNTER — APPOINTMENT (OUTPATIENT)
Dept: CARDIOLOGY | Facility: CLINIC | Age: 69
End: 2024-09-09

## 2024-09-25 ENCOUNTER — APPOINTMENT (OUTPATIENT)
Dept: INFECTIOUS DISEASE | Facility: CLINIC | Age: 69
End: 2024-09-25

## 2024-10-01 ENCOUNTER — NON-APPOINTMENT (OUTPATIENT)
Age: 69
End: 2024-10-01

## 2024-10-01 ENCOUNTER — APPOINTMENT (OUTPATIENT)
Dept: OPHTHALMOLOGY | Facility: CLINIC | Age: 69
End: 2024-10-01
Payer: MEDICARE

## 2024-10-01 PROCEDURE — 99213 OFFICE O/P EST LOW 20 MIN: CPT

## 2024-10-14 ENCOUNTER — APPOINTMENT (OUTPATIENT)
Dept: OPHTHALMOLOGY | Facility: CLINIC | Age: 69
End: 2024-10-14

## 2024-10-22 ENCOUNTER — APPOINTMENT (OUTPATIENT)
Dept: CARDIOLOGY | Facility: CLINIC | Age: 69
End: 2024-10-22
Payer: MEDICARE

## 2024-10-22 VITALS
BODY MASS INDEX: 26.5 KG/M2 | SYSTOLIC BLOOD PRESSURE: 138 MMHG | HEIGHT: 62 IN | WEIGHT: 144 LBS | DIASTOLIC BLOOD PRESSURE: 84 MMHG | OXYGEN SATURATION: 99 % | HEART RATE: 69 BPM

## 2024-10-22 DIAGNOSIS — I10 ESSENTIAL (PRIMARY) HYPERTENSION: ICD-10-CM

## 2024-10-22 DIAGNOSIS — I25.119 ATHEROSCLEROTIC HEART DISEASE OF NATIVE CORONARY ARTERY WITH UNSPECIFIED ANGINA PECTORIS: ICD-10-CM

## 2024-10-22 PROCEDURE — 99214 OFFICE O/P EST MOD 30 MIN: CPT

## 2024-10-28 ENCOUNTER — LABORATORY RESULT (OUTPATIENT)
Age: 69
End: 2024-10-28

## 2024-10-30 RX ORDER — AMLODIPINE BESYLATE 2.5 MG/1
2.5 TABLET ORAL DAILY
Qty: 90 | Refills: 3 | Status: DISCONTINUED | COMMUNITY
Start: 2024-10-22 | End: 2024-10-30

## 2024-11-01 ENCOUNTER — APPOINTMENT (OUTPATIENT)
Dept: CARDIOLOGY | Facility: CLINIC | Age: 69
End: 2024-11-01
Payer: MEDICARE

## 2024-11-01 VITALS
BODY MASS INDEX: 26.89 KG/M2 | SYSTOLIC BLOOD PRESSURE: 116 MMHG | DIASTOLIC BLOOD PRESSURE: 70 MMHG | HEART RATE: 73 BPM | OXYGEN SATURATION: 99 % | WEIGHT: 147 LBS

## 2024-11-01 PROCEDURE — 99214 OFFICE O/P EST MOD 30 MIN: CPT

## 2024-11-01 RX ORDER — LOSARTAN POTASSIUM 25 MG/1
25 TABLET, FILM COATED ORAL
Qty: 90 | Refills: 0 | Status: DISCONTINUED | COMMUNITY
Start: 2024-10-30 | End: 2024-11-01

## 2024-11-06 ENCOUNTER — APPOINTMENT (OUTPATIENT)
Dept: GASTROENTEROLOGY | Facility: CLINIC | Age: 69
End: 2024-11-06

## 2024-11-21 ENCOUNTER — APPOINTMENT (OUTPATIENT)
Dept: CARDIOLOGY | Facility: CLINIC | Age: 69
End: 2024-11-21
Payer: MEDICARE

## 2024-11-21 VITALS
SYSTOLIC BLOOD PRESSURE: 148 MMHG | WEIGHT: 142 LBS | OXYGEN SATURATION: 99 % | BODY MASS INDEX: 26.13 KG/M2 | DIASTOLIC BLOOD PRESSURE: 82 MMHG | HEIGHT: 62 IN | HEART RATE: 52 BPM

## 2024-11-21 DIAGNOSIS — I10 ESSENTIAL (PRIMARY) HYPERTENSION: ICD-10-CM

## 2024-11-21 PROCEDURE — 99212 OFFICE O/P EST SF 10 MIN: CPT

## 2024-11-27 ENCOUNTER — APPOINTMENT (OUTPATIENT)
Dept: INFECTIOUS DISEASE | Facility: CLINIC | Age: 69
End: 2024-11-27

## 2024-12-03 PROBLEM — R33.9 URINARY RETENTION: Status: ACTIVE | Noted: 2024-12-03

## 2024-12-05 ENCOUNTER — APPOINTMENT (OUTPATIENT)
Dept: NEPHROLOGY | Facility: CLINIC | Age: 69
End: 2024-12-05

## 2024-12-05 ENCOUNTER — RX RENEWAL (OUTPATIENT)
Age: 69
End: 2024-12-05

## 2025-01-15 ENCOUNTER — APPOINTMENT (OUTPATIENT)
Dept: UROLOGY | Facility: CLINIC | Age: 70
End: 2025-01-15

## 2025-01-21 ENCOUNTER — APPOINTMENT (OUTPATIENT)
Dept: NEPHROLOGY | Facility: CLINIC | Age: 70
End: 2025-01-21
Payer: MEDICARE

## 2025-01-21 VITALS
RESPIRATION RATE: 16 BRPM | DIASTOLIC BLOOD PRESSURE: 86 MMHG | OXYGEN SATURATION: 99 % | HEART RATE: 51 BPM | WEIGHT: 140 LBS | BODY MASS INDEX: 25.76 KG/M2 | SYSTOLIC BLOOD PRESSURE: 142 MMHG | HEIGHT: 62 IN

## 2025-01-21 PROCEDURE — 99215 OFFICE O/P EST HI 40 MIN: CPT

## 2025-01-22 ENCOUNTER — LABORATORY RESULT (OUTPATIENT)
Age: 70
End: 2025-01-22

## 2025-01-23 RX ORDER — LEVOFLOXACIN 750 MG/1
750 TABLET, FILM COATED ORAL DAILY
Qty: 5 | Refills: 0 | Status: ACTIVE | COMMUNITY
Start: 2025-01-23 | End: 1900-01-01

## 2025-01-30 LAB
APPEARANCE: CLEAR
BILIRUBIN URINE: NEGATIVE
BLOOD URINE: ABNORMAL
COLOR: YELLOW
GLUCOSE QUALITATIVE U: NEGATIVE MG/DL
HBA1C MFR BLD HPLC: 5.5
KETONES URINE: NEGATIVE MG/DL
LEUKOCYTE ESTERASE URINE: ABNORMAL
NITRITE URINE: NEGATIVE
PH URINE: 7.5
PROTEIN URINE: NEGATIVE MG/DL
SPECIFIC GRAVITY URINE: 1.01
UROBILINOGEN URINE: 0.2 MG/DL

## 2025-03-12 ENCOUNTER — APPOINTMENT (OUTPATIENT)
Dept: NEPHROLOGY | Facility: CLINIC | Age: 70
End: 2025-03-12

## 2025-03-26 ENCOUNTER — APPOINTMENT (OUTPATIENT)
Dept: INFECTIOUS DISEASE | Facility: CLINIC | Age: 70
End: 2025-03-26
Payer: MEDICARE

## 2025-03-26 VITALS
HEART RATE: 93 BPM | HEIGHT: 62 IN | SYSTOLIC BLOOD PRESSURE: 142 MMHG | OXYGEN SATURATION: 100 % | DIASTOLIC BLOOD PRESSURE: 90 MMHG | WEIGHT: 135 LBS | BODY MASS INDEX: 24.84 KG/M2 | TEMPERATURE: 97.3 F

## 2025-03-26 DIAGNOSIS — Z88.9 ALLERGY STATUS TO UNSPECIFIED DRUGS, MEDICAMENTS AND BIOLOGICAL SUBSTANCES: ICD-10-CM

## 2025-03-26 DIAGNOSIS — N39.0 URINARY TRACT INFECTION, SITE NOT SPECIFIED: ICD-10-CM

## 2025-03-26 PROCEDURE — 99213 OFFICE O/P EST LOW 20 MIN: CPT

## 2025-03-26 RX ORDER — LINACLOTIDE 290 UG/1
290 CAPSULE, GELATIN COATED ORAL
Refills: 0 | Status: ACTIVE | COMMUNITY

## 2025-03-26 RX ORDER — METHENAMINE HIPPURATE 1 G/1
1 TABLET ORAL TWICE DAILY
Qty: 60 | Refills: 2 | Status: ACTIVE | COMMUNITY
Start: 2025-03-26 | End: 1900-01-01

## 2025-04-08 ENCOUNTER — APPOINTMENT (OUTPATIENT)
Dept: OPHTHALMOLOGY | Facility: CLINIC | Age: 70
End: 2025-04-08

## 2025-04-21 ENCOUNTER — NON-APPOINTMENT (OUTPATIENT)
Age: 70
End: 2025-04-21

## 2025-04-23 ENCOUNTER — APPOINTMENT (OUTPATIENT)
Dept: INFECTIOUS DISEASE | Facility: CLINIC | Age: 70
End: 2025-04-23
Payer: MEDICARE

## 2025-04-23 VITALS
SYSTOLIC BLOOD PRESSURE: 112 MMHG | BODY MASS INDEX: 25.76 KG/M2 | HEART RATE: 94 BPM | TEMPERATURE: 98 F | OXYGEN SATURATION: 98 % | WEIGHT: 140 LBS | DIASTOLIC BLOOD PRESSURE: 78 MMHG | HEIGHT: 62 IN

## 2025-04-23 DIAGNOSIS — K59.00 CONSTIPATION, UNSPECIFIED: ICD-10-CM

## 2025-04-23 DIAGNOSIS — N39.0 URINARY TRACT INFECTION, SITE NOT SPECIFIED: ICD-10-CM

## 2025-04-23 PROCEDURE — 99214 OFFICE O/P EST MOD 30 MIN: CPT

## 2025-04-25 ENCOUNTER — NON-APPOINTMENT (OUTPATIENT)
Age: 70
End: 2025-04-25

## 2025-05-06 ENCOUNTER — APPOINTMENT (OUTPATIENT)
Dept: CARDIOLOGY | Facility: CLINIC | Age: 70
End: 2025-05-06
Payer: MEDICARE

## 2025-05-06 VITALS
BODY MASS INDEX: 25.58 KG/M2 | RESPIRATION RATE: 16 BRPM | SYSTOLIC BLOOD PRESSURE: 130 MMHG | WEIGHT: 139 LBS | HEIGHT: 62 IN | HEART RATE: 71 BPM | OXYGEN SATURATION: 96 % | DIASTOLIC BLOOD PRESSURE: 72 MMHG

## 2025-05-06 DIAGNOSIS — I10 ESSENTIAL (PRIMARY) HYPERTENSION: ICD-10-CM

## 2025-05-06 DIAGNOSIS — R00.2 PALPITATIONS: ICD-10-CM

## 2025-05-06 DIAGNOSIS — E87.1 HYPO-OSMOLALITY AND HYPONATREMIA: ICD-10-CM

## 2025-05-06 DIAGNOSIS — R07.89 OTHER CHEST PAIN: ICD-10-CM

## 2025-05-06 DIAGNOSIS — T14.8XXA OTHER INJURY OF UNSPECIFIED BODY REGION, INITIAL ENCOUNTER: ICD-10-CM

## 2025-05-06 DIAGNOSIS — E78.5 HYPERLIPIDEMIA, UNSPECIFIED: ICD-10-CM

## 2025-05-06 DIAGNOSIS — I25.119 ATHEROSCLEROTIC HEART DISEASE OF NATIVE CORONARY ARTERY WITH UNSPECIFIED ANGINA PECTORIS: ICD-10-CM

## 2025-05-06 PROCEDURE — G2211 COMPLEX E/M VISIT ADD ON: CPT

## 2025-05-06 PROCEDURE — 99213 OFFICE O/P EST LOW 20 MIN: CPT

## 2025-05-08 LAB — A1CG - A1C WITH ESTIMATED AVERAGE GLUCOSE: 5.5

## 2025-05-13 ENCOUNTER — NON-APPOINTMENT (OUTPATIENT)
Age: 70
End: 2025-05-13

## 2025-05-13 DIAGNOSIS — N39.0 URINARY TRACT INFECTION, SITE NOT SPECIFIED: ICD-10-CM

## 2025-05-13 RX ORDER — NITROFURANTOIN (MONOHYDRATE/MACROCRYSTALS) 25; 75 MG/1; MG/1
100 CAPSULE ORAL
Qty: 14 | Refills: 0 | Status: ACTIVE | COMMUNITY
Start: 2025-05-13 | End: 1900-01-01

## 2025-05-13 RX ORDER — CIPROFLOXACIN HYDROCHLORIDE 500 MG/1
500 TABLET, FILM COATED ORAL
Qty: 10 | Refills: 0 | Status: ACTIVE | COMMUNITY
Start: 2025-05-13 | End: 1900-01-01

## 2025-05-14 LAB
APPEARANCE: CLEAR
BILIRUBIN URINE: NEGATIVE
BLOOD URINE: NEGATIVE
COLOR: YELLOW
GLUCOSE QUALITATIVE U: NEGATIVE MG/DL
KETONES URINE: NEGATIVE MG/DL
LEUKOCYTE ESTERASE URINE: NEGATIVE
NITRITE URINE: NEGATIVE
PH URINE: 7.5
PROTEIN URINE: NEGATIVE MG/DL
SPECIFIC GRAVITY URINE: 1.01
UROBILINOGEN URINE: 0.2 MG/DL

## 2025-06-04 ENCOUNTER — NON-APPOINTMENT (OUTPATIENT)
Age: 70
End: 2025-06-04

## 2025-06-04 ENCOUNTER — APPOINTMENT (OUTPATIENT)
Dept: OPHTHALMOLOGY | Facility: CLINIC | Age: 70
End: 2025-06-04
Payer: MEDICARE

## 2025-06-04 PROCEDURE — 92014 COMPRE OPH EXAM EST PT 1/>: CPT

## 2025-06-26 ENCOUNTER — NON-APPOINTMENT (OUTPATIENT)
Age: 70
End: 2025-06-26

## 2025-08-05 DIAGNOSIS — N39.0 URINARY TRACT INFECTION, SITE NOT SPECIFIED: ICD-10-CM

## 2025-08-07 ENCOUNTER — LABORATORY RESULT (OUTPATIENT)
Age: 70
End: 2025-08-07

## 2025-08-13 LAB
APPEARANCE: CLEAR
BILIRUBIN URINE: NEGATIVE
BLOOD URINE: NEGATIVE
COLOR: YELLOW
GLUCOSE QUALITATIVE U: NEGATIVE MG/DL
KETONES URINE: NEGATIVE MG/DL
LEUKOCYTE ESTERASE URINE: ABNORMAL
NITRITE URINE: NEGATIVE
PH URINE: 7
PROTEIN URINE: NEGATIVE MG/DL
SPECIFIC GRAVITY URINE: 1.01
UROBILINOGEN URINE: 0.2 MG/DL